# Patient Record
Sex: MALE | Race: ASIAN | NOT HISPANIC OR LATINO | URBAN - METROPOLITAN AREA
[De-identification: names, ages, dates, MRNs, and addresses within clinical notes are randomized per-mention and may not be internally consistent; named-entity substitution may affect disease eponyms.]

---

## 2021-09-01 ENCOUNTER — EMERGENCY (EMERGENCY)
Facility: HOSPITAL | Age: 41
LOS: 1 days | Discharge: HOME | End: 2021-09-01
Attending: STUDENT IN AN ORGANIZED HEALTH CARE EDUCATION/TRAINING PROGRAM | Admitting: EMERGENCY MEDICINE
Payer: MEDICAID

## 2021-09-01 VITALS
RESPIRATION RATE: 18 BRPM | TEMPERATURE: 98 F | SYSTOLIC BLOOD PRESSURE: 164 MMHG | OXYGEN SATURATION: 97 % | HEART RATE: 56 BPM | DIASTOLIC BLOOD PRESSURE: 113 MMHG

## 2021-09-01 LAB
ALBUMIN SERPL ELPH-MCNC: 4.6 G/DL — SIGNIFICANT CHANGE UP (ref 3.3–5)
ALP SERPL-CCNC: 92 U/L — SIGNIFICANT CHANGE UP (ref 40–120)
ALT FLD-CCNC: 28 U/L — SIGNIFICANT CHANGE UP (ref 4–41)
ANION GAP SERPL CALC-SCNC: 14 MMOL/L — SIGNIFICANT CHANGE UP (ref 7–14)
AST SERPL-CCNC: 20 U/L — SIGNIFICANT CHANGE UP (ref 4–40)
BASOPHILS # BLD AUTO: 0.02 K/UL — SIGNIFICANT CHANGE UP (ref 0–0.2)
BASOPHILS NFR BLD AUTO: 0.2 % — SIGNIFICANT CHANGE UP (ref 0–2)
BILIRUB SERPL-MCNC: 0.5 MG/DL — SIGNIFICANT CHANGE UP (ref 0.2–1.2)
BUN SERPL-MCNC: 10 MG/DL — SIGNIFICANT CHANGE UP (ref 7–23)
CALCIUM SERPL-MCNC: 9.7 MG/DL — SIGNIFICANT CHANGE UP (ref 8.4–10.5)
CHLORIDE SERPL-SCNC: 100 MMOL/L — SIGNIFICANT CHANGE UP (ref 98–107)
CO2 SERPL-SCNC: 25 MMOL/L — SIGNIFICANT CHANGE UP (ref 22–31)
CREAT SERPL-MCNC: 0.77 MG/DL — SIGNIFICANT CHANGE UP (ref 0.5–1.3)
EOSINOPHIL # BLD AUTO: 0.21 K/UL — SIGNIFICANT CHANGE UP (ref 0–0.5)
EOSINOPHIL NFR BLD AUTO: 2.4 % — SIGNIFICANT CHANGE UP (ref 0–6)
GLUCOSE SERPL-MCNC: 78 MG/DL — SIGNIFICANT CHANGE UP (ref 70–99)
HCT VFR BLD CALC: 41.8 % — SIGNIFICANT CHANGE UP (ref 39–50)
HGB BLD-MCNC: 13.5 G/DL — SIGNIFICANT CHANGE UP (ref 13–17)
IANC: 3.61 K/UL — SIGNIFICANT CHANGE UP (ref 1.5–8.5)
IMM GRANULOCYTES NFR BLD AUTO: 0.6 % — SIGNIFICANT CHANGE UP (ref 0–1.5)
LYMPHOCYTES # BLD AUTO: 4.2 K/UL — HIGH (ref 1–3.3)
LYMPHOCYTES # BLD AUTO: 48.4 % — HIGH (ref 13–44)
MCHC RBC-ENTMCNC: 25.9 PG — LOW (ref 27–34)
MCHC RBC-ENTMCNC: 32.3 GM/DL — SIGNIFICANT CHANGE UP (ref 32–36)
MCV RBC AUTO: 80.1 FL — SIGNIFICANT CHANGE UP (ref 80–100)
MONOCYTES # BLD AUTO: 0.59 K/UL — SIGNIFICANT CHANGE UP (ref 0–0.9)
MONOCYTES NFR BLD AUTO: 6.8 % — SIGNIFICANT CHANGE UP (ref 2–14)
NEUTROPHILS # BLD AUTO: 3.61 K/UL — SIGNIFICANT CHANGE UP (ref 1.8–7.4)
NEUTROPHILS NFR BLD AUTO: 41.6 % — LOW (ref 43–77)
NRBC # BLD: 0 /100 WBCS — SIGNIFICANT CHANGE UP
NRBC # FLD: 0 K/UL — SIGNIFICANT CHANGE UP
PLATELET # BLD AUTO: 323 K/UL — SIGNIFICANT CHANGE UP (ref 150–400)
POTASSIUM SERPL-MCNC: 3.5 MMOL/L — SIGNIFICANT CHANGE UP (ref 3.5–5.3)
POTASSIUM SERPL-SCNC: 3.5 MMOL/L — SIGNIFICANT CHANGE UP (ref 3.5–5.3)
PROT SERPL-MCNC: 7.8 G/DL — SIGNIFICANT CHANGE UP (ref 6–8.3)
RBC # BLD: 5.22 M/UL — SIGNIFICANT CHANGE UP (ref 4.2–5.8)
RBC # FLD: 13.2 % — SIGNIFICANT CHANGE UP (ref 10.3–14.5)
SARS-COV-2 RNA SPEC QL NAA+PROBE: SIGNIFICANT CHANGE UP
SODIUM SERPL-SCNC: 139 MMOL/L — SIGNIFICANT CHANGE UP (ref 135–145)
TROPONIN T, HIGH SENSITIVITY RESULT: <6 NG/L — SIGNIFICANT CHANGE UP
TROPONIN T, HIGH SENSITIVITY RESULT: <6 NG/L — SIGNIFICANT CHANGE UP
WBC # BLD: 8.68 K/UL — SIGNIFICANT CHANGE UP (ref 3.8–10.5)
WBC # FLD AUTO: 8.68 K/UL — SIGNIFICANT CHANGE UP (ref 3.8–10.5)

## 2021-09-01 PROCEDURE — 71046 X-RAY EXAM CHEST 2 VIEWS: CPT | Mod: 26

## 2021-09-01 PROCEDURE — 93010 ELECTROCARDIOGRAM REPORT: CPT

## 2021-09-01 PROCEDURE — 99220: CPT | Mod: 25

## 2021-09-01 NOTE — ED CDU PROVIDER INITIAL DAY NOTE - OBJECTIVE STATEMENT
41M no known PMH p/w chest pain. States symptoms began last night ~10P, pressure like sensation to L chest. Again awoke with pain this AM, went to [facility] who then sent to ED. Still with pain, 3-4/10. Received  at . No change in exercise tolerance. No VAUGHAN, sob, abd pain, dizziness/lightheadedness. FH notable for MI in father in late 30s. In ED, labs unremarkable, EKG with no ischemic changes, trop < 6, CXR clear.

## 2021-09-01 NOTE — ED PROVIDER NOTE - CLINICAL SUMMARY MEDICAL DECISION MAKING FREE TEXT BOX
41M no PMH p/w chest pain x1 day, pressure-like, non-radiating to L chest with FH notable for early MI in father - given ongoing pain, concern for unstable angina/ACS - will get trop, ekg, cxr, basic labs, likely CDU for stress

## 2021-09-01 NOTE — ED CDU PROVIDER INITIAL DAY NOTE - MEDICAL DECISION MAKING DETAILS
41M no known PMH p/w chest pain and significant FH for father with MI in late 30s - as pt still symptomatic at time of initial eval, will dispo to CDU for tele monitoring, stress in AM, possible cards eval in AM pending results

## 2021-09-01 NOTE — ED ADULT NURSE NOTE - OBJECTIVE STATEMENT
Patient is a 41-year-old male, A&OX4, ambulatory no known Phx c/o left-sided chest pain since yesterday. 20 G to L AC. Labs sent. Breathing even and unlabored, chest rise equal b/l. Will continue to monitor.

## 2021-09-01 NOTE — ED PROVIDER NOTE - OBJECTIVE STATEMENT
41M no known PMH p/w chest pain. States symptoms began last night ~10P, pressure like sensation to L chest. Again awoke with pain this AM, went to [facility] who then sent to ED. Still with pain, 3-4/10. Received  at . No change in exercise tolerance. No VAUGHAN, sob, abd pain, dizziness/lightheadedness. FH notable for MI in father in late 30s.

## 2021-09-01 NOTE — ED ADULT TRIAGE NOTE - CHIEF COMPLAINT QUOTE
Pt sent from urgent care for eval of HTN. Pt reports constant left sided chest pain since yesterday. Denies SOB. Denies PMH

## 2021-09-02 VITALS
TEMPERATURE: 98 F | HEART RATE: 69 BPM | RESPIRATION RATE: 16 BRPM | OXYGEN SATURATION: 98 % | SYSTOLIC BLOOD PRESSURE: 128 MMHG | DIASTOLIC BLOOD PRESSURE: 89 MMHG

## 2021-09-02 LAB
CHOLEST SERPL-MCNC: 212 MG/DL — HIGH
HDLC SERPL-MCNC: 33 MG/DL — LOW
LIPID PNL WITH DIRECT LDL SERPL: 123 MG/DL — HIGH
NON HDL CHOLESTEROL: 179 MG/DL — HIGH
TRIGL SERPL-MCNC: 282 MG/DL — HIGH

## 2021-09-02 PROCEDURE — 93016 CV STRESS TEST SUPVJ ONLY: CPT | Mod: GC

## 2021-09-02 PROCEDURE — 93018 CV STRESS TEST I&R ONLY: CPT | Mod: GC

## 2021-09-02 PROCEDURE — 78452 HT MUSCLE IMAGE SPECT MULT: CPT | Mod: 26,ME

## 2021-09-02 PROCEDURE — 99217: CPT

## 2021-09-02 NOTE — ED CDU PROVIDER DISPOSITION NOTE - CARE PLAN
Received fax of denial services for a provider that no longer work with our orthopedic department , writer call [facility] where provider is not located and will fax information to them and will place copy in scanning.  Sherie Mera LPN    
1

## 2021-09-02 NOTE — ED CDU PROVIDER DISPOSITION NOTE - CLINICAL COURSE
41yM w/no pmhx p/w chest pain. Family hx of father with MI in late 30s. In ED, labs unremarkable, EKG with no ischemic changes, trop < 6, CXR clear. Sent to CDU for stress test and tele doc evaluation. Stress test normal, no evidence of ischemia. Pt evaluated by tele doc and cleared for outpt f/u. Pt to f/u with his PMD and cards. Discussed return precautions.

## 2021-09-02 NOTE — ED CDU PROVIDER DISPOSITION NOTE - INCLUDE COVID-19 DISCHARGE INSTRUCTIONS
Mauc Instructions: By selecting yes to the question below the MAUC number will be added into the note.  This will be calculated automatically based on the diagnosis chosen, the size entered, the body zone selected (H,M,L) and the specific indications you chose. You will also have the option to override the Mohs AUC if you disagree with the automatically calculated number and this option is found in the Case Summary tab. <-------- Click here to INCLUDE CoVID-19 Discharge Instructions

## 2021-09-02 NOTE — CONSULT NOTE ADULT - SUBJECTIVE AND OBJECTIVE BOX
HISTORY OF PRESENT ILLNESS: HPI:    41 year old male with hx premature CAD in father, no PMH, presented to the ER with chest pain.  Currently pain free.  Symptoms spont resolved.  Denies hx HTN, DM, TIA/CVA/ or CAD.  PMD is Ramsaroop.        PAST MEDICAL & SURGICAL HISTORY:  No pertinent past medical history    No significant past surgical history      MEDICATIONS: none      Allergies  No Known Allergies      FAMILY HISTORY:  FHx: early MI (Father)  Noncontributory for premature coronary disease or sudden cardiac death    SOCIAL HISTORY:    [x ] Non-smoker  [ ] Smoker  [ ] Alcohol    FLU VACCINE THIS YEAR STARTS IN AUGUST:  [ ] Yes    [ ] No    IF OVER 65 HAVE YOU EVER HAD A PNA VACCINE:  [ ] Yes    [ ] No       [ ] N/A      REVIEW OF SYSTEMS:  [x ]chest pain  [  ]shortness of breath  [  ]palpitations  [  ]syncope  [ ]near syncope [ ]upper extremity weakness   [ ] lower extremity weakness  [  ]diplopia  [  ]altered mental status   [  ]fevers  [ ]chills [ ]nausea  [ ]vomiting  [  ]dysphagia    [ ]abdominal pain  [ ]melena  [ ]BRBPR    [  ]epistaxis  [  ]rash    [ ]lower extremity edema        [x ] All others negative	  [ ] Unable to obtain      LABS:	 	    CARDIAC MARKERS:  Trop T <6, <6                        13.5   8.68  )-----------( 323      ( 01 Sep 2021 16:35 )             41.8     139  |  100  |  10  ----------------------------<  78  3.5   |  25  |  0.77    Ca    9.7      01 Sep 2021 16:35    TPro  7.8  /  Alb  4.6  /  TBili  0.5  /  DBili  x   /  AST  20  /  ALT  28  /  AlkPhos  92  09-01    Creatinine Trend: 0.77<--    PHYSICAL EXAM:  T(C): 36.6 (09-02-21 @ 11:00), Max: 37 (09-02-21 @ 06:46)  HR: 60 (09-02-21 @ 11:00) (52 - 93)  BP: 135/87 (09-02-21 @ 11:00) (122/88 - 164/113)  RR: 16 (09-02-21 @ 11:00) (16 - 19)  SpO2: 100% (09-02-21 @ 11:00) (97% - 100%)    Gen: Appears well in NAD  HEENT:  (-)icterus (-)pallor  CV: N S1 S2 1/6 CLEMENTINE (+)2 Pulses B/l  Resp:  Clear to ausculatation B/L, normal effort  GI: (+) BS Soft, NT, ND  Lymph:  (-)Edema, (-)obvious lymphadenopathy  Skin: Warm to touch, Normal turgor  Psych: Appropriate mood and affect	      ECG:  NSR  tele- SR	    RADIOLOGY:     < from: Xray Chest 2 Views PA/Lat (09.01.21 @ 15:27) >  IMPRESSION:  Clear lungs.    < end of copied text >       < from: Nuclear Stress Test-Exercise (Nuclear Stress Test-Exercise .) (09.02.21 @ 08:46) >  IMPRESSIONS:Normal Study  * Myocardial Perfusion SPECT results are normal at 88 % of  MPHR.  * Review of raw data shows: The study is of good technical  quality. However, gating quality was too poor for  analysis.  * The left ventricle was normal in size. Normal myocardial  perfusion scan,with no evidence of infarction or inducible  ischemia.  ------------------------------------------------------------------------  Confirmed on  9/2/2021 - 10:45:04 by Farhat Montgomery M.D.    < end of copied text >      ASSESSMENT/PLAN: 	41 year old male with hx premature CAD in father, no PMH, presented to the ER with chest pain.    --ACS ruled out  --Pt with no ischemia on NST at 6 METS  --no further inpatient work up recommended   --will arrange for close OP f/u in the office [phone]

## 2021-09-02 NOTE — ED CDU PROVIDER SUBSEQUENT DAY NOTE - PHYSICAL EXAMINATION
· CONSTITUTIONAL: Well appearing, awake, alert, oriented to person, place, time/situation and in no apparent distress.  · CARDIAC: Normal rate, regular rhythm.  Heart sounds S1, S2.  No murmurs, rubs or gallops.  · RESPIRATORY: Breath sounds clear and equal bilaterally.  · GASTROINTESTINAL: Abdomen soft, non-tender, no guarding.  · MUSCULOSKELETAL: Spine appears normal, range of motion is not limited, no muscle or joint tenderness  · SKIN: Skin normal color for race, warm, dry and intact. No evidence of rash.  · PSYCHIATRIC: Alert and oriented to person, place, time/situation. normal mood and affect. no apparent risk to self or others.
Vital signs reviewed.   CONSTITUTIONAL: Well-appearing; well-nourished; in no apparent distress. Non-toxic appearing.   HEAD: Normocephalic, atraumatic.  EYES: PERRL, EOM intact, normal conjunctiva and no sclera injection noted  ENT: normal nose; no rhinorrhea  CARD: Normal S1, S2  RESP: Normal chest excursion with respiration; breath sounds clear and equal bilaterally  ABD/GI: soft, non-distended; non-tender; no palpable organomegaly, no pulsatile mass.  EXT/MS: moves all extremities; distal pulses are normal, no pedal edema.  SKIN: Normal for age and race; warm; dry; good turgor; no apparent lesions or exudate noted.  NEURO: Awake, alert, oriented x 3  PSYCH: Normal mood; appropriate affect.

## 2021-09-02 NOTE — ED CDU PROVIDER SUBSEQUENT DAY NOTE - HISTORY
41M no known PMH p/w chest pain. In ED, labs unremarkable, EKG with no ischemic changes, trop < 6, CXR clear. Awaiting Stress test
· HPI Objective Statement: 41M no known PMH p/w chest pain. FH notable for MI in father in late 30s. CDU for telemetry and stress test

## 2021-09-02 NOTE — ED CDU PROVIDER DISPOSITION NOTE - PATIENT PORTAL LINK FT
You can access the FollowMyHealth Patient Portal offered by [facility] by registering at the following website: [email or web address]. By joining [facility]’s FollowMyHealth portal, you will also be able to view your health information using other applications (apps) compatible with our system.

## 2021-09-02 NOTE — CONSULT NOTE ADULT - ATTENDING COMMENTS
Patient seen and examined.  Agree with above.   Admitted to the CDU with chest pain   NST normal with no ischemia  No further inpatient cardiac workup needed at this time.     Hyacinth Cramer MD

## 2021-09-02 NOTE — ED CDU PROVIDER SUBSEQUENT DAY NOTE - PROGRESS NOTE DETAILS
Nuclear stress test normal, pt seen by cardiology and cleared from cardiac perspective for outpt follow up. Pt has a PMD to f/u with as well, will return to the ER with any worsening or concerning symptoms.

## 2021-09-02 NOTE — ED CDU PROVIDER SUBSEQUENT DAY NOTE - MEDICAL DECISION MAKING DETAILS
· HPI Objective Statement: 41M no known PMH p/w chest pain. FH notable for MI in father in late 30s. CDU for telemetry and stress test
41M no known PMH p/w chest pain. In ED, labs unremarkable, EKG with no ischemic changes, trop < 6, CXR clear. Awaiting Stress test

## 2021-09-02 NOTE — ED CDU PROVIDER SUBSEQUENT DAY NOTE - NSICDXPASTMEDICALHX_GEN_ALL_CORE_FT
PAST MEDICAL HISTORY:  No pertinent past medical history     
PAST MEDICAL HISTORY:  No pertinent past medical history

## 2021-09-02 NOTE — ED CDU PROVIDER DISPOSITION NOTE - CARE PROVIDER_API CALL
Hyacinth Cramer (MD)  Cardiovascular Disease; Internal Medicine; Interventional Cardiology  [address] [place] [address]  Phone: [phone]  Fax: [phone]  Follow Up Time:    Hyacinth Cramer (MD)  Cardiovascular Disease; Internal Medicine; Interventional Cardiology  [address] [place] [address]  Phone: [phone]  Fax: [phone]  Follow Up Time:     Ion Tomlin  CARDIOLOGY  [address] [place] [address], [address]  [place] [address]  Phone: [phone]  Fax: [phone]  Follow Up Time:

## 2021-09-02 NOTE — ED CDU PROVIDER DISPOSITION NOTE - PROVIDER TOKENS
PROVIDER:[TOKEN:[[id number]]] PROVIDER:[TOKEN:[[id number]]],PROVIDER:[TOKEN:[[id number]]]

## 2021-09-02 NOTE — ED CDU PROVIDER SUBSEQUENT DAY NOTE - NSICDXFAMILYHX_GEN_ALL_CORE_FT
FAMILY HISTORY:  Father  Still living? Unknown  FHx: early MI, Age at diagnosis: Age Unknown    
FAMILY HISTORY:  Father  Still living? Unknown  FHx: early MI, Age at diagnosis: Age Unknown

## 2021-09-02 NOTE — ED CDU PROVIDER SUBSEQUENT DAY NOTE - NS ED ROS FT
Constitutional: (-) fever  Head: Normal cephalic, Atraumatic  Cardiovascular: (+) chest pain, (-) wheezing  Respiratory: (-) cough, (-) shortness of breath  Gastrointestinal: (-) vomiting, (-) diarrhea, (-) abdominal pain  : (-) dysuria   Musculoskeletal: (-) back pain  Integumentary: (-) rash, (-) edema  Neurological: (-)loc  Allergic/Immunologic: (-) pruritus
· CONSTITUTIONAL: no fever and no chills.  · CARDIOVASCULAR: - - -  · Cardiovascular [+]: CHEST PAIN  · Cardiovascular [-]: no palpitations, no peripheral edema, no syncope  · RESPIRATORY: no chest pain, no cough, and no shortness of breath.  · GASTROINTESTINAL: no abdominal pain, no bloating, no constipation, no diarrhea, no nausea and no vomiting.  · GENITOURINARY: - - -  · Genitourinary [-]: no difficulty urinating, no dysuria  · MUSCULOSKELETAL: - - -  · Musculoskeletal [-]: no back pain, no calf pain, no joint pain, no neck pain  · SKIN: - - -  · Skin [-]: no rash  · NEUROLOGICAL: - - -  · Neurological [-]: no headache  · PSYCHIATRIC: no known mental health issues.  · ENDOCRINE: no diabetes and no thyroid trouble.

## 2021-09-02 NOTE — ED CDU PROVIDER DISPOSITION NOTE - NSFOLLOWUPINSTRUCTIONS_ED_ALL_ED_FT
Follow up with cardiologist  within 1-2 weeks, you were provided an appointment at time of discharge, office information listed above  Follow up with your primary care doctor within 1 week  Return to the ER with any worsening or concerning symptoms, increased pain, shortness of breath, dizziness, weakness or any other concerns. Follow up with cardiology within 1-2 weeks, you were provided an appointment at time of discharge, office information listed above  Follow up with your primary care doctor within 1 week  Return to the ER with any worsening or concerning symptoms, increased pain, shortness of breath, dizziness, weakness or any other concerns.

## 2022-06-21 PROBLEM — Z78.9 OTHER SPECIFIED HEALTH STATUS: Chronic | Status: ACTIVE | Noted: 2021-09-01

## 2022-06-23 ENCOUNTER — APPOINTMENT (OUTPATIENT)
Dept: ORTHOPEDIC SURGERY | Facility: CLINIC | Age: 42
End: 2022-06-23
Payer: MEDICAID

## 2022-06-23 VITALS — DIASTOLIC BLOOD PRESSURE: 80 MMHG | HEART RATE: 85 BPM | SYSTOLIC BLOOD PRESSURE: 123 MMHG

## 2022-06-23 VITALS — HEIGHT: 66 IN | BODY MASS INDEX: 27.32 KG/M2 | WEIGHT: 170 LBS

## 2022-06-23 DIAGNOSIS — M22.2X1 PATELLOFEMORAL DISORDERS, RIGHT KNEE: ICD-10-CM

## 2022-06-23 PROBLEM — Z00.00 ENCOUNTER FOR PREVENTIVE HEALTH EXAMINATION: Status: ACTIVE | Noted: 2022-06-23

## 2022-06-23 PROCEDURE — 99203 OFFICE O/P NEW LOW 30 MIN: CPT

## 2022-06-27 NOTE — DISCUSSION/SUMMARY
[de-identified] : Discussed findings of today's exam and possible causes of patient's pain.  Educated patient on their most probable diagnosis of right knee patellofemoral pain syndrome brought on after running on a treadmill.  Reviewed possible courses of treatment, and we collaboratively decided best course of treatment at this time will include conservative management.  Patient is given reassurance he has no evidence of acute internal derangement, he appears to just have patellofemoral pain syndrome after recent treadmill run.  Patient may continue taking Aleve as needed, advised to take 2 tablets with food, 2 times a day for the next 1 to 2 weeks (We discussed all possible side effects of this medication).  Patient will be started on a course of physical therapy to restore normal range of motion and strength as tolerated.  Follow up as needed.  Patient appreciates and agrees with current plan.\par \par \par This note was generated using dragon medical dictation software.  A reasonable effort has been made for proofreading its contents, but typos may still remain.  If there are any questions or points of clarification needed please notify my office.\par

## 2022-06-27 NOTE — PHYSICAL EXAM
[de-identified] : Constitutional: Well-nourished, well-developed, No acute distress\par Respiratory:  Good respiratory effort, no SOB\par Lymphatic: No regional lymphadenopathy, no lymphedema\par Psychiatric: Pleasant and normal affect, alert and oriented x3\par Skin: Clean dry and intact B/L LE\par Musculoskeletal: normal except where as noted in regional exam\par \par Right Knee:\par APPEARANCE: no marked deformities, no swelling or malalignment\par POSITIVE TENDERNESS:  + crepitus of the anterior knee, and tenderness of patellar retinaculum\par NONTENDER: jt lines b/l, patellar & quadriceps tendons, MCL/LCL, ITB at the lateral femoral condyle & Gerdy's tubercle, pes bursa. \par ROM: full & painless, although some discomfort in deep knee flexion\par RESISTIVE TESTING: + discomfort with knee ext from deep knee flexion (stretched position), painless knee flexion. \par SPECIAL TESTS: stable v/v stress. painless grind. neg Lachman's. neg ant/post drawer. neg Lizeth's. \par \par

## 2022-06-27 NOTE — HISTORY OF PRESENT ILLNESS
[de-identified] : Patient arrived 30 minutes late to their scheduled appointment.\par Patient is here for right knee pain that began several weeks ago. There was no inciting injury. The pain is constant. He runs in the gym. He does not take pain medcations. He had surgery to his knee when he was 3. He does desk work. \par \par The patient's past medical history, past surgical history, medications and allergies were reviewed by me today and documented accordingly. In addition, the patient's family and social history, which were noncontributory to this visit, were reviewed also. Intake form was reviewed. The patient has no family history of arthritis.

## 2022-07-07 ENCOUNTER — TRANSCRIPTION ENCOUNTER (OUTPATIENT)
Age: 42
End: 2022-07-07

## 2022-08-03 ENCOUNTER — TRANSCRIPTION ENCOUNTER (OUTPATIENT)
Age: 42
End: 2022-08-03

## 2023-07-20 NOTE — ED PROVIDER NOTE - NS_EDPROVIDERDISPOUSERTYPE_ED_A_ED
Depression
Attending Attestation (For Attendings USE Only)...

## 2025-06-12 NOTE — ED PROVIDER NOTE - ALCOHOL USE HISTORY SINGLE SELECT
Head, normocephalic, atraumatic, Face, Face within normal limits, Ears, External ears within normal limits, Nose/Nasopharynx, External nose normal appearance, nares patent, no nasal discharge, Mouth and Throat, Oral cavity appearance normal, Lips, Appearance normal
never